# Patient Record
Sex: FEMALE | Race: WHITE | NOT HISPANIC OR LATINO | Employment: UNEMPLOYED | URBAN - METROPOLITAN AREA
[De-identification: names, ages, dates, MRNs, and addresses within clinical notes are randomized per-mention and may not be internally consistent; named-entity substitution may affect disease eponyms.]

---

## 2017-02-16 DIAGNOSIS — N95.2 VAGINAL ATROPHY: ICD-10-CM

## 2017-02-16 NOTE — TELEPHONE ENCOUNTER
Pt Pharmacy sent a Fax requesting a refill of Premarin vaginal cream. Pt last seen on 10/27/16 by Devon Maloney.

## 2017-03-21 ENCOUNTER — PATIENT MESSAGE (OUTPATIENT)
Dept: UROGYNECOLOGY | Facility: CLINIC | Age: 42
End: 2017-03-21

## 2017-03-23 ENCOUNTER — OFFICE VISIT (OUTPATIENT)
Dept: UROGYNECOLOGY | Facility: CLINIC | Age: 42
End: 2017-03-23
Payer: COMMERCIAL

## 2017-03-23 VITALS
HEIGHT: 65 IN | BODY MASS INDEX: 22.03 KG/M2 | SYSTOLIC BLOOD PRESSURE: 120 MMHG | DIASTOLIC BLOOD PRESSURE: 82 MMHG | WEIGHT: 132.25 LBS

## 2017-03-23 DIAGNOSIS — Z87.898 HISTORY OF URINARY URGENCY: ICD-10-CM

## 2017-03-23 DIAGNOSIS — N95.2 VAGINAL ATROPHY: Primary | ICD-10-CM

## 2017-03-23 DIAGNOSIS — M62.89 PELVIC FLOOR TENSION: ICD-10-CM

## 2017-03-23 DIAGNOSIS — N94.10 FEMALE DYSPAREUNIA: ICD-10-CM

## 2017-03-23 PROCEDURE — 1160F RVW MEDS BY RX/DR IN RCRD: CPT | Mod: S$GLB,,, | Performed by: NURSE PRACTITIONER

## 2017-03-23 PROCEDURE — 99999 PR PBB SHADOW E&M-EST. PATIENT-LVL III: CPT | Mod: PBBFAC,,, | Performed by: NURSE PRACTITIONER

## 2017-03-23 PROCEDURE — 99213 OFFICE O/P EST LOW 20 MIN: CPT | Mod: S$GLB,,, | Performed by: NURSE PRACTITIONER

## 2017-03-23 RX ORDER — CEFDINIR 300 MG/1
CAPSULE ORAL
COMMUNITY
Start: 2017-02-17 | End: 2024-01-10

## 2017-03-23 RX ORDER — METHYLPREDNISOLONE 4 MG/1
TABLET ORAL
COMMUNITY
Start: 2017-02-17 | End: 2017-03-23 | Stop reason: ALTCHOICE

## 2017-03-23 NOTE — PROGRESS NOTES
Urogyn follow up  03/23/2017  .  [facility]  [address] [address]    Ashley Barrett  [id number]  1975      Ashley Barrett is a 41 y.o.  here for a urogyn follow up.    09/16/2016  Title of Operation:  1)  Total laparoscopic hysterectomy  2) Laparoscopic bilateral salpingectomy  3) Laparoscopic right oophorectomy  4) Laparoscopic sacral colpopexy  5) Repair of bladder serosa (small, right, bladder base)  6) Placement of retropubic tension-free midurethral sling, Advantage Fit   7) Cystourethroscopy     Indications for Surgery:  1) UI: (+) MCKINLEY (mostly with exercise) > (--) UUI (--) pads: Daytime frequency: Q 2 hours. Nocturia: Yes: 1/night. (--) dysuria, (--) hematuria, (--) frequent UTIs. (+) complete bladder emptying.    09/13/2016  Suds   1. VOIDING PHASE:     a. Uroflowmetry:  · Prolapse reduction: No  · Voided volume: 268 mL   · Voiding time: 46 seconds  · Max flow: 82 mL/s  · Avg flow: 7 mL/s   · PVR: 0 mL  The overall configuration of this uroflow study was  normal.     At this point, the UDS machine was not working, and the remainder of the procedure was aborted. Instead, office cystometry was performed.     Office cystometry:  The patient was placed in dorsal lithotomy position with feet in stirrups. The urethra was prepped x 2 with betadine. A red rubber catheter was used to drain the bladder. The bladder was filled with 560 mL sterile water to gravity with a 60 mL thomason tipped syringe, at which point she voiced an urgent desire to void, strong desire was noted at 360 mL. The catheter was removed. She was asked to cough with (+) leak. The bladder was then drained with a catheter. She tolerated the procedure well.    Impression: (+) MCKINLEY    These findings are consistent with Positive female stress incontinence.    Assessment:  UF normal. MCKINLEY (+).   2) POP: Absent. Symptoms:(--) . (--) vaginal bleeding. (--) vaginal discharge. (+) sexually active. (+)  dyspareunia with deep penetration. . (--) Vaginal dryness. (--) vaginal estrogen use.   --POP-Q per Knoepp: Aa +2; Ba +2; C -4; Ap 0; Bp 0; D -6. Genital hiatus 3, perineal body ,2 total vaginal length 11-12.  3) BM: (--) constipation/straining. (--) chronic diarrhea. (--) hematochezia. (--) fecal incontinence. (--) fecal smearing/urgency. (--) incomplete evacuation.   4) Ovarian cyst on US:   Pelvic ultrasound  08/2016  US 8/16: Right ovary is measures 3.7 x 2.0 x 1.9 cm. There is a mildly complex 1.3 cm right ovarian cyst containing internal septation.   --reviewed with ONC with low malignant potential  --will send for intraop path       Preoperative Diagnosis:  1)  Uterovaginal prolapse  2) Cystocele  3) Mixed urinary incontinence  4) Female stress incontinence  5) Ovarian cyst, right     Postoperative Diagnosis:  1)  Uterovaginal prolapse  2) Cystocele  3) Mixed urinary incontinence  4) Female stress incontinence  5) Ovarian cyst, right (benign on frozen pathology)  6) Ovarian cyst, left (simple, probable corpus luteal)  7) Serosal bladder defect (small    09/29/2016  Cystourethrogram.   Voiding trial.   Cystourethroscopy.    Findings: Urethroscopy:  Normal.  Cystoscopy:  Normal bladder mucosa with some moderate thomason-related cystitis, mainly along the bladder base and trigone. No obvious mesh or defect noted, kobe at the mid base, cephalad to the trigone, where the serosa was oversewn intraoperatively. Bilateral ureteral flow was noted.     Assessment: Essentially normal cystourethroscopy with evidence of thomason-related cystitis.  Past Medical History:   Diagnosis Date    PONV (postoperative nausea and vomiting)     nausea only       Past Surgical History:   Procedure Laterality Date    BREAST SURGERY      augumentation    HYSTERECTOMY      SKIN TAG REMOVAL      vaginal area     History since last visit: Denies pain, bleeding, or discharge.  Bladder issues:Denies UI, dysuria, or frequency. Urgency is  "improved.  Bowel issues: Denies pain, bleeding, or discharge  Complains of dyspareunia with deep penetration-- pain stops after intercourse.    Medications:    Current Outpatient Prescriptions:     fluoxetine (PROZAC) 20 MG capsule, once daily. , Disp: , Rfl:     multivitamin capsule, Take 1 capsule by mouth once daily., Disp: , Rfl:     cefdinir (OMNICEF) 300 MG capsule, , Disp: , Rfl:     conjugated estrogens (PREMARIN) vaginal cream, Place 0.5 g vaginally twice a week., Disp: 30 g, Rfl: 3    flavoxate (URISPAS) 100 mg Tab, Take 1 tablet (100 mg total) by mouth 3 (three) times daily as needed., Disp: 42 tablet, Rfl: 0  **    ROS:  As per HPI.      Exam  /82  Ht 5' 5" (1.651 m)  Wt 60 kg (132 lb 4.4 oz)  LMP 09/03/2016 (Exact Date)  BMI 22.01 kg/m2  General: alert and oriented, no acute distress  Respiratory: normal respiratory effort  Abd: soft, non-tender, non-distended    Pelvic  Ext. Genitalia: normal external genitalia. Normal bartholin's and skeens glands  Vagina: + atrophy. Normal vaginal mucosa without lesions. No discharge noted.  No TTP along sling path.  No mesh palpable/ visible.  Non-tender bladder base without palpable mass. +TTP in bilateral levator muscles and OI  L>R.  Cervix: absent  Uterus:  absent   Urethra: no masses or tenderness  Urethral meatus: no lesions, caruncle or prolapse.    POP-Q: No prolapse    Impression  1. Vaginal atrophy     2. Pelvic floor tension  Ambulatory consult to Physical Therapy   3. Female dyspareunia  Ambulatory consult to Physical Therapy   4. History of urinary urgency       We reviewed the above issues and discussed options for short-term versus long-term management of her problems.   Plan:   1. Post op well healed.  You can resume normal activity.  You may resume intercourse.  Go slow, use lubrication.  Get assistance lifting anything over 50 pounds.  2. Continue vaginal estrogen cream 0.5 g twice weekly  Empty bladder every 3 hours.  Empty well: " wait a minute, lean forward on toilet.    Start pelvic floor PT with Rima Biswas-- [facility].  [phone]--- call to schedule  Send me a [facility] message in 2 months and let me know how you are doing.    3. Continue to control bowel movements  4. She will follow up with us in 6 months      30 minutes were spent in face to face time with this patient  75 % of this time was spent in counseling and/or coordination of care    CONI Vyas-BC [facility]  Division of Female Pelvic Medicine and Reconstructive Surgery  Department of Obstetrics & Gynecology

## 2017-03-23 NOTE — MR AVS SNAPSHOT
[facility]  [address] [address]  Phone: [phone]                  Ashley Barrett   3/23/2017 9:30 AM   Office Visit    Description:  Female : 1975   Provider:  Silvia Maloney NP   Department:  [facility]           Reason for Visit     Post-op Evaluation           Diagnoses this Visit        Comments    Vaginal atrophy    -  Primary     Pelvic floor tension         Female dyspareunia         History of urinary urgency                To Do List           Goals (5 Years of Data)     None      Follow-Up and Disposition     Return in about 6 months (around 2017).      [facility]     [facility] -  Assistance  Registered nurses in the [facility] provide clinical advisement, health education, appointment booking, and other advisory services.  Call for this free service at [phone].             Medications           Message regarding Medications     Verify the changes and/or additions to your medication regime listed below are the same as discussed with your clinician today.  If any of these changes or additions are incorrect, please notify your healthcare provider.        STOP taking these medications     methylPREDNISolone (MEDROL DOSEPACK) 4 mg tablet            Verify that the below list of medications is an accurate representation of the medications you are currently taking.  If none reported, the list may be blank. If incorrect, please contact your healthcare provider. Carry this list with you in case of emergency.           Current Medications     fluoxetine (PROZAC) 20 MG capsule once daily.     multivitamin capsule Take 1 capsule by mouth once daily.    cefdinir (OMNICEF) 300 MG capsule     conjugated estrogens (PREMARIN) vaginal cream Place 0.5 g vaginally twice a week.    flavoxate (URISPAS) 100 mg Tab Take 1 tablet (100 mg total) by mouth 3 (three) times daily as needed.  "          Clinical Reference Information           Your Vitals Were     BP Height Weight Last Period BMI    120/82 5' 5" (1.651 m) 60 kg (132 lb 4.4 oz) 09/03/2016 (Exact Date) 22.01 kg/m2      Blood Pressure          Most Recent Value    BP  120/82      Allergies as of 3/23/2017     No Known Allergies      Immunizations Administered on Date of Encounter - 3/23/2017     None      Orders Placed During Today's Visit      Normal Orders This Visit    Ambulatory consult to Physical Therapy       Instructions    1. Post op well healed.  You can resume normal activity.  You may resume intercourse.  Go slow, use lubrication.  Get assistance lifting anything over 50 pounds.  2. Continue vaginal estrogen cream 0.5 g twice weekly  Empty bladder every 3 hours.  Empty well: wait a minute, lean forward on toilet.    Start pelvic floor PT with Rima Biswas-- [facility].  [phone]--- call to schedule  Send me a [facility] message in 2 months and let me know how you are doing.    3. Continue to control bowel movements  4. She will follow up with us in 6 months       Language Assistance Services     ATTENTION: Language assistance services are available, free of charge. Please call [phone].      ATENCIÓN: Si habla nichol, tiene a edmondson disposición servicios gratuitos de asistencia lingüística. Llame al [phone].     BOOKER Ý: N?u b?n nói Ti?ng Vi?t, có các d?ch v? h? tr? ngôn ng? mi?n phí dành cho b?n. G?i s? [phone].         [facility] complies with applicable Federal civil rights laws and does not discriminate on the basis of race, color, national origin, age, disability, or sex.        "

## 2017-03-23 NOTE — PATIENT INSTRUCTIONS
1. Post op well healed.  You can resume normal activity.  You may resume intercourse.  Go slow, use lubrication.  Get assistance lifting anything over 50 pounds.  2. Continue vaginal estrogen cream 0.5 g twice weekly  Empty bladder every 3 hours.  Empty well: wait a minute, lean forward on toilet.    Start pelvic floor PT with Rima Biswas-- [facility].  [phone]--- call to schedule  Send me a [facility] message in 2 months and let me know how you are doing.    3. Continue to control bowel movements  4. She will follow up with us in 6 months

## 2017-09-20 ENCOUNTER — TELEPHONE (OUTPATIENT)
Dept: UROGYNECOLOGY | Facility: CLINIC | Age: 42
End: 2017-09-20

## 2017-09-22 ENCOUNTER — OFFICE VISIT (OUTPATIENT)
Dept: UROGYNECOLOGY | Facility: CLINIC | Age: 42
End: 2017-09-22
Payer: COMMERCIAL

## 2017-09-22 VITALS
DIASTOLIC BLOOD PRESSURE: 60 MMHG | SYSTOLIC BLOOD PRESSURE: 100 MMHG | HEIGHT: 65 IN | BODY MASS INDEX: 22.22 KG/M2 | WEIGHT: 133.38 LBS

## 2017-09-22 DIAGNOSIS — N95.2 VAGINAL ATROPHY: ICD-10-CM

## 2017-09-22 DIAGNOSIS — M62.89 PELVIC FLOOR TENSION: Primary | ICD-10-CM

## 2017-09-22 DIAGNOSIS — N94.10 FEMALE DYSPAREUNIA: ICD-10-CM

## 2017-09-22 PROCEDURE — 3008F BODY MASS INDEX DOCD: CPT | Mod: S$GLB,,, | Performed by: NURSE PRACTITIONER

## 2017-09-22 PROCEDURE — 99999 PR PBB SHADOW E&M-EST. PATIENT-LVL III: CPT | Mod: PBBFAC,,, | Performed by: NURSE PRACTITIONER

## 2017-09-22 PROCEDURE — 99213 OFFICE O/P EST LOW 20 MIN: CPT | Mod: S$GLB,,, | Performed by: NURSE PRACTITIONER

## 2017-09-22 RX ORDER — FLUOXETINE HYDROCHLORIDE 20 MG/1
20 CAPSULE ORAL
COMMUNITY
End: 2017-09-22

## 2017-09-22 NOTE — PROGRESS NOTES
Urogyn follow up  09/22/2017  .  [facility]  [address] [address]    Ashley Barrett  [id number]  1975      Ashley Barrett is a 42 y.o.  here for a urogyn follow up.    09/16/2016  Title of Operation:  1)  Total laparoscopic hysterectomy  2) Laparoscopic bilateral salpingectomy  3) Laparoscopic right oophorectomy  4) Laparoscopic sacral colpopexy  5) Repair of bladder serosa (small, right, bladder base)  6) Placement of retropubic tension-free midurethral sling, Advantage Fit   7) Cystourethroscopy     Indications for Surgery:  1) UI: (+) MCKINLEY (mostly with exercise) > (--) UUI (--) pads: Daytime frequency: Q 2 hours. Nocturia: Yes: 1/night. (--) dysuria, (--) hematuria, (--) frequent UTIs. (+) complete bladder emptying.    09/13/2016  Suds   1. VOIDING PHASE:     a. Uroflowmetry:  · Prolapse reduction: No  · Voided volume: 268 mL   · Voiding time: 46 seconds  · Max flow: 82 mL/s  · Avg flow: 7 mL/s   · PVR: 0 mL  The overall configuration of this uroflow study was  normal.     At this point, the UDS machine was not working, and the remainder of the procedure was aborted. Instead, office cystometry was performed.     Office cystometry:  The patient was placed in dorsal lithotomy position with feet in stirrups. The urethra was prepped x 2 with betadine. A red rubber catheter was used to drain the bladder. The bladder was filled with 560 mL sterile water to gravity with a 60 mL thomason tipped syringe, at which point she voiced an urgent desire to void, strong desire was noted at 360 mL. The catheter was removed. She was asked to cough with (+) leak. The bladder was then drained with a catheter. She tolerated the procedure well.    Impression: (+) MCKINLEY    These findings are consistent with Positive female stress incontinence.    Assessment:  UF normal. MCKINLEY (+).   2) POP: Absent. Symptoms:(--) . (--) vaginal bleeding. (--) vaginal discharge. (+) sexually active. (+)  dyspareunia with deep penetration. . (--) Vaginal dryness. (--) vaginal estrogen use.   --POP-Q per Knoepp: Aa +2; Ba +2; C -4; Ap 0; Bp 0; D -6. Genital hiatus 3, perineal body ,2 total vaginal length 11-12.  3) BM: (--) constipation/straining. (--) chronic diarrhea. (--) hematochezia. (--) fecal incontinence. (--) fecal smearing/urgency. (--) incomplete evacuation.   4) Ovarian cyst on US:   Pelvic ultrasound  08/2016  US 8/16: Right ovary is measures 3.7 x 2.0 x 1.9 cm. There is a mildly complex 1.3 cm right ovarian cyst containing internal septation.   --reviewed with ONC with low malignant potential  --will send for intraop path       Preoperative Diagnosis:  1)  Uterovaginal prolapse  2) Cystocele  3) Mixed urinary incontinence  4) Female stress incontinence  5) Ovarian cyst, right     Postoperative Diagnosis:  1)  Uterovaginal prolapse  2) Cystocele  3) Mixed urinary incontinence  4) Female stress incontinence  5) Ovarian cyst, right (benign on frozen pathology)  6) Ovarian cyst, left (simple, probable corpus luteal)  7) Serosal bladder defect (small    09/29/2016  Cystourethrogram.   Voiding trial.   Cystourethroscopy.    Findings: Urethroscopy:  Normal.  Cystoscopy:  Normal bladder mucosa with some moderate thomason-related cystitis, mainly along the bladder base and trigone. No obvious mesh or defect noted, kobe at the mid base, cephalad to the trigone, where the serosa was oversewn intraoperatively. Bilateral ureteral flow was noted.     Assessment: Essentially normal cystourethroscopy with evidence of thomason-related cystitis.  Past Medical History:   Diagnosis Date    PONV (postoperative nausea and vomiting)     nausea only       Past Surgical History:   Procedure Laterality Date    BREAST SURGERY      augumentation    HYSTERECTOMY      SKIN TAG REMOVAL      vaginal area     History since last visit:Denies bleeding or discharge.  Having aching pressure in L side of her vagina and in rectum, non-  "radiating.  She was lifting objects heavier than 50 pounds recently.  Denies bulge in the vagina.  Bladder issues:Denies UI, dysuria, or frequency. Urgency is improved.  Bowel issues: Denies contipation, straining, fecal smearing.  Not using fiber supplement.  Complains of dyspareunia with deep penetration, not as bad as last visit---did not do pelvic floor PT with Rima Biswas in [place]    Medications:    Current Outpatient Prescriptions:     fluoxetine (PROZAC) 20 MG capsule, once daily. , Disp: , Rfl:     multivitamin capsule, Take 1 capsule by mouth once daily., Disp: , Rfl:     cefdinir (OMNICEF) 300 MG capsule, , Disp: , Rfl:     conjugated estrogens (PREMARIN) vaginal cream, Place 0.5 g vaginally twice a week., Disp: 30 g, Rfl: 3    flavoxate (URISPAS) 100 mg Tab, Take 1 tablet (100 mg total) by mouth 3 (three) times daily as needed., Disp: 42 tablet, Rfl: 0  **    ROS:  As per HPI.      Exam  /60 (BP Location: Right arm, Patient Position: Sitting)   Ht 5' 5" (1.651 m)   Wt 60.5 kg (133 lb 6.1 oz)   LMP 09/03/2016 (Exact Date)   BMI 22.20 kg/m²   General: alert and oriented, no acute distress  Respiratory: normal respiratory effort  Abd: soft, non-tender, non-distended    Pelvic  Ext. Genitalia: normal external genitalia. Normal bartholin's and skeens glands  Vagina: + atrophy. Normal vaginal mucosa without lesions. No discharge noted.  No TTP along sling path.  No mesh palpable/ visible.  Non-tender bladder base without palpable mass. +TTP in bilateral levator muscles and OI  L>R.  Cervix: absent  Uterus:  absent   Urethra: no masses or tenderness  Urethral meatus: no lesions, caruncle or prolapse.    POP-Q: No prolapse noted    Impression  No diagnosis found.  We reviewed the above issues and discussed options for short-term versus long-term management of her problems.   Plan:   1. Get assistance lifting anything over 50 pounds.  2. Continue vaginal estrogen cream 0.5 g twice " weekly  Empty bladder every 3 hours.  Empty well: wait a minute, lean forward on toilet.    Start pelvic floor PT with Rima Biswas-- [facility].  [phone]--- call to schedule    3. Continue to control bowel movements  4. She will follow up with us in 4 months      30 minutes were spent in face to face time with this patient  75 % of this time was spent in counseling and/or coordination of care    Silvia Maloney, CONI-BC [facility]  Division of Female Pelvic Medicine and Reconstructive Surgery  Department of Obstetrics & Gynecology

## 2017-09-22 NOTE — PATIENT INSTRUCTIONS
1. Get assistance lifting anything over 50 pounds.  2. Continue vaginal estrogen cream 0.5 g twice weekly  Empty bladder every 3 hours.  Empty well: wait a minute, lean forward on toilet.    Start pelvic floor PT with Rima Biswas-- [facility].  [phone]--- call to schedule    3. Continue to control bowel movements  4. She will follow up with us in 4 months

## 2017-09-23 PROBLEM — M62.89 PELVIC FLOOR TENSION: Status: ACTIVE | Noted: 2017-09-23

## 2017-09-23 PROBLEM — N95.2 VAGINAL ATROPHY: Status: ACTIVE | Noted: 2017-09-23

## 2017-10-03 ENCOUNTER — PATIENT MESSAGE (OUTPATIENT)
Dept: UROGYNECOLOGY | Facility: CLINIC | Age: 42
End: 2017-10-03

## 2017-10-16 ENCOUNTER — CLINICAL SUPPORT (OUTPATIENT)
Dept: REHABILITATION | Facility: HOSPITAL | Age: 42
End: 2017-10-16
Payer: COMMERCIAL

## 2017-10-16 DIAGNOSIS — N95.2 VAGINAL ATROPHY: ICD-10-CM

## 2017-10-16 DIAGNOSIS — M62.89 PELVIC FLOOR TENSION: Primary | ICD-10-CM

## 2017-10-16 PROCEDURE — 97140 MANUAL THERAPY 1/> REGIONS: CPT | Performed by: PHYSICAL THERAPIST

## 2017-10-16 PROCEDURE — 97535 SELF CARE MNGMENT TRAINING: CPT | Performed by: PHYSICAL THERAPIST

## 2017-10-16 PROCEDURE — 97161 PT EVAL LOW COMPLEX 20 MIN: CPT | Performed by: PHYSICAL THERAPIST

## 2017-10-16 NOTE — PROGRESS NOTES
OUTPATIENT PHYSICAL THERAPY EVALUATION        Patient: Ashley Su [facility] #:  [id number]    Date of treatment: 10/16/2017       HISTORY      Ashley is a 42 y.o. female evaluated on 10/16/2017    Physician:  Silvia Maloney NP   Diagnosis:   Encounter Diagnoses   Name Primary?    Pelvic floor tension Yes    Vaginal atrophy       Treatment ordered: Physical Therapy  Medical History:   Past Medical History:   Diagnosis Date    PONV (postoperative nausea and vomiting)     nausea only      Surgical History:   Past Surgical History:   Procedure Laterality Date    BREAST SURGERY      augumentation    HYSTERECTOMY      SKIN TAG REMOVAL      vaginal area      Medications:   Current Outpatient Prescriptions   Medication Sig    cefdinir (OMNICEF) 300 MG capsule     conjugated estrogens (PREMARIN) vaginal cream Place 0.5 g vaginally twice a week.    flavoxate (URISPAS) 100 mg Tab Take 1 tablet (100 mg total) by mouth 3 (three) times daily as needed.    fluoxetine (PROZAC) 20 MG capsule once daily.     multivitamin capsule Take 1 capsule by mouth once daily.     No current facility-administered medications for this visit.        Allergies: Review of patient's allergies indicates:  No Known Allergies       OB/GYN History:  childbirth vaginal delivery, prolapse, painful vaginal penetration, pelvic pain and pelvic sling and partial hysterectomy    Bladder/Bowel History: none      SUBJECTIVE     Date of onset: several years ago, began having pain with intercourse and increased bleeding during intercourse  History of current complaint: 1 year  Patient's goals for therapy: non painful sexual intercourse  Pain: Patient reports 6/10, with 0 being the lowest and 10 being the highest.    Activities that cause symptoms: sexual activity    Previous treatment included no    Sexually active? yes    Frequency of urination:   Daytime: 5-6x            Nighttime: 1x nightly    Difficulty initiating urine stream: no    Urine stream: weak and splayed  Complete emptying:yes   Bladder leakage: no but double voiding      Frequency of bowel movements: 1-2 times a day  Difficulty initiating BM: no  Quality/Shape of BM: [place] Stool Chart 4-5  Colon leakage: no        Types of fluid intake: water, coffee and sports drinks  Diet: WNL  Current exercise:5x weekly- resitence training nad running    Occupation: Pt not working    OBJECTIVE     ORTHO SCREEN  Posture: WNL  Pelvic alignment: no sign of deviations noted in supine  Palpation: increased tone and pain with palpation to iliopsoas mm  ABDOMINALS  Scarring: incisions from hysterectomy on abdominal area  Diastasis: 1-2 fingers above umbilicus, 1-2fingers at umbilicus  Abdominal strength: Rectus: 4/5     TA: 4/5  Chaperone: refused  Consent signed: yes    VAGINAL PELVIC FLOOR EXAM    EXTERNAL ASSESSMENT  Introitus: WNL  Skin condition: WNL     Pain:  none  Voluntary contraction: visible lift  Voluntary relaxation: visible drop  Involuntary contraction: visible lift  Bearing down: bulge  Perineal descent: absent      INTERNAL ASSESSMENT  Pain: none   Sensation: WNL  Vaginal vault: WNL  Muscle Bulk: WNL  Muscle Power: 3/5  Muscle Endurance: 3 sec  # Reps To Fatigue: 3         SEMG EVALUATION: Deferred due to time constraints          Other: increased abdominal breathing - educated on diaphragmatic breathing    TREATMENT      Therapeutic Exercise to develop  Improved diaphragmatic breathing for 5 minutes including: diaphragmatic breathing and iliopsoas streches      Manual Therapy to develop extensibility for 8 minutes including: trigger point/myofascial release of iliopsoas m    Evaluation    Education: instructed on general anatomy/physiology of urinary/bowel system; discussed plan of care with patient; instructed in benefits/risks of treatment; instructed in alternative methods of treatment; instructed in risks of refusing treatment; patient a parent agreed to treatment plan.     Also  educated in: anatomy/physiology of pelvic floor, intercourse positions, diaphragmatic breathing, kegels and HEP    ASSESSMENT      This is a 42 y.o. female referred to outpatient physical therapy and presents with a medical diagnosis of pelvic pain. Patient will benefit from skilled physical therapy to improve pain during sexual intercourse.    SEMG Problems: did not evaluate  No skin irritation, erythema, or other adverse effects observed from electrode placement.    Abuse/Neglect: no signs  Nutritional Status: WDWN       Pt's spiritual, cultural and educational needs considered and pt agreeable to plan of care and goals as stated below:     Medical necessity is demonstrated by the following IMPAIRMENTS/PROMBLEMS     History  Co-morbidities and personal factors that may impact the plan of care Examination  Body Structures and Functions, activity limitations and participation restrictions that may impact the plan of care Clinical Presentation   Decision Making/ Complexity Score   Co-morbidities:                 Personal Factors:    Body Regions/Systems/Functions:    decreased pelvic muscle strength, decreased endurance of the pelvic muscles, increased tension of the pelvic muscles and increased nocturia           Activity limitations:   Barriers to Learning: none  Environmental Barriers: none noted    Participation Restrictions: sexual intercourse          low           PLAN    Frequency:1-2 x week  Duration: 4-6 weeks  Short Term Goals: 1-2 weeks   Pt will report improved ability to sleep uninterrupted by excessive nocturia 5/7 days per week.   Pt will urinate without crede/Valsalva to prevent adverse effects to adjacent structures.  Pt/family will be independent with HEP for continued self-management of symptoms.    Long Term Goals: 4-6 weeks   Pt deny pain with sexual intercourse    Physical therapy will include: therapeutic exercises, neuromuscular re-education, manual therapy, modalities PRN and patient/family  education      Therapist: Rima Biswas, PT  10/16/2017

## 2017-10-23 ENCOUNTER — CLINICAL SUPPORT (OUTPATIENT)
Dept: REHABILITATION | Facility: HOSPITAL | Age: 42
End: 2017-10-23
Payer: COMMERCIAL

## 2017-10-23 DIAGNOSIS — M62.89 PELVIC FLOOR TENSION: Primary | ICD-10-CM

## 2017-10-23 PROCEDURE — 97110 THERAPEUTIC EXERCISES: CPT | Performed by: PHYSICAL THERAPIST

## 2017-10-23 PROCEDURE — 97140 MANUAL THERAPY 1/> REGIONS: CPT | Performed by: PHYSICAL THERAPIST

## 2017-10-23 NOTE — PROGRESS NOTES
OUTPATIENT PHYSICAL THERAPY DAILY NOTE       Patient: Ashley Su [facility] #:  [id number]    Diagnosis:   Encounter Diagnosis   Name Primary?    Pelvic floor tension Yes      Date of treatment: 10/23/2017     Visit # 2    SUBJECTIVE   Pt reports: decreased hip pain after run and during intercourse since last visit      OBJECTIVE     Therapeutic Exercise to develop  Increased endurance and strength in pelvic floor mm during running and workout to reduce risk of pelvic sling failure for 15 minutes including:   Kegels with assist of SEMG    Manual Therapy to develop flexibility and extensibility in R iliopsoas for 15 minutes including:MFR and MET      Modalities NT    Education: 15 min: progression of hip strengthening and running. Discussed progression of plan of care with patient; educated pt in activity modification; reviewed HEP with pt. Pt demonstrated and verbalized understanding of all instruction and was provided with a handout of HEP      ASSESSMENT    Pt had decreased tone and tenderness to iliopsoas mm today. Resistance with   Selective tension test to R  Iliopsoas increased abdominal pain. R hip flexor mm strength 4-/5 and L 5/5  Pt tolerated entire treatment well with no visible adverse effects. Pt  Will the patient continue to benefit from skilled PT intervention        PLAN     Continue with established Plan of Care, working toward established PT goals.

## 2017-10-23 NOTE — PROGRESS NOTES
OUTPATIENT PHYSICAL THERAPY EVALUATION        Patient: Ashley Su [facility] #:  [id number]    Date of treatment: 10/23/2017       HISTORY      Ashley is a 42 y.o. female evaluated on 10/23/2017    Physician:  Silvia Maloney NP   Diagnosis:   Encounter Diagnosis   Name Primary?    Pelvic floor tension Yes      Treatment ordered: Physical Therapy  Medical History:   Past Medical History:   Diagnosis Date    PONV (postoperative nausea and vomiting)     nausea only      Surgical History:   Past Surgical History:   Procedure Laterality Date    BREAST SURGERY      augumentation    HYSTERECTOMY      SKIN TAG REMOVAL      vaginal area      Medications:   Current Outpatient Prescriptions   Medication Sig    cefdinir (OMNICEF) 300 MG capsule     conjugated estrogens (PREMARIN) vaginal cream Place 0.5 g vaginally twice a week.    flavoxate (URISPAS) 100 mg Tab Take 1 tablet (100 mg total) by mouth 3 (three) times daily as needed.    fluoxetine (PROZAC) 20 MG capsule once daily.     multivitamin capsule Take 1 capsule by mouth once daily.     No current facility-administered medications for this visit.        Allergies: Review of patient's allergies indicates:  No Known Allergies       OB/GYN History:  childbirth vaginal delivery, prolapse, painful vaginal penetration, pelvic pain and pelvic sling and partial hysterectomy    Bladder/Bowel History: none      SUBJECTIVE     Date of onset: several years ago, began having pain with intercourse and increased bleeding during intercourse  History of current complaint: 1 year  Patient's goals for therapy: non painful sexual intercourse  Pain: Patient reports 6/10, with 0 being the lowest and 10 being the highest.    Activities that cause symptoms: sexual activity    Previous treatment included no    Sexually active? yes    Frequency of urination:   Daytime: 5-6x            Nighttime: 1x nightly    Difficulty initiating urine stream: no   Urine stream: weak  and splayed  Complete emptying:yes   Bladder leakage: no but double voiding      Frequency of bowel movements: 1-2 times a day  Difficulty initiating BM: no  Quality/Shape of BM: Sharkey Stool Chart 4-5  Colon leakage: no        Types of fluid intake: water, coffee and sports drinks  Diet: WNL  Current exercise:5x weekly- resitence training nad running    Occupation: Pt not working    OBJECTIVE     ORTHO SCREEN  Posture: WNL  Pelvic alignment: no sign of deviations noted in supine  Palpation: increased tone and pain with palpation to iliopsoas mm  ABDOMINALS  Scarring: incisions from hysterectomy on abdominal area  Diastasis: 1-2 fingers above umbilicus, 1-2fingers at umbilicus  Abdominal strength: Rectus: 4/5     TA: 4/5  Chaperone: refused  Consent signed: yes    VAGINAL PELVIC FLOOR EXAM    EXTERNAL ASSESSMENT  Introitus: WNL  Skin condition: WNL     Pain:  none  Voluntary contraction: visible lift  Voluntary relaxation: visible drop  Involuntary contraction: visible lift  Bearing down: bulge  Perineal descent: absent      INTERNAL ASSESSMENT  Pain: none   Sensation: WNL  Vaginal vault: WNL  Muscle Bulk: WNL  Muscle Power: 3/5  Muscle Endurance: 3 sec  # Reps To Fatigue: 3         SEMG EVALUATION: Deferred due to time constraints          Other: increased abdominal breathing - educated on diaphragmatic breathing    TREATMENT      Therapeutic Exercise to develop  Improved diaphragmatic breathing for 5 minutes including: diaphragmatic breathing and iliopsoas streches      Manual Therapy to develop extensibility for 8 minutes including: trigger point/myofascial release of iliopsoas m    Evaluation    Education: instructed on general anatomy/physiology of urinary/bowel system; discussed plan of care with patient; instructed in benefits/risks of treatment; instructed in alternative methods of treatment; instructed in risks of refusing treatment; patient a parent agreed to treatment plan.     Also educated in:  anatomy/physiology of pelvic floor, intercourse positions, diaphragmatic breathing, kegels and HEP    ASSESSMENT      This is a 42 y.o. female referred to outpatient physical therapy and presents with a medical diagnosis of pelvic pain. Patient will benefit from skilled physical therapy to improve pain during sexual intercourse.    SEMG Problems: did not evaluate  No skin irritation, erythema, or other adverse effects observed from electrode placement.    Abuse/Neglect: no signs  Nutritional Status: WDWN       Pt's spiritual, cultural and educational needs considered and pt agreeable to plan of care and goals as stated below:     Medical necessity is demonstrated by the following IMPAIRMENTS/PROMBLEMS     History  Co-morbidities and personal factors that may impact the plan of care Examination  Body Structures and Functions, activity limitations and participation restrictions that may impact the plan of care Clinical Presentation   Decision Making/ Complexity Score   Co-morbidities:                 Personal Factors:    Body Regions/Systems/Functions:    decreased pelvic muscle strength, decreased endurance of the pelvic muscles, increased tension of the pelvic muscles and increased nocturia           Activity limitations:   Barriers to Learning: none  Environmental Barriers: none noted    Participation Restrictions: sexual intercourse          low           PLAN    Frequency:1-2 x week  Duration: 4-6 weeks  Short Term Goals: 1-2 weeks   Pt will report improved ability to sleep uninterrupted by excessive nocturia 5/7 days per week.   Pt will urinate without crede/Valsalva to prevent adverse effects to adjacent structures.  Pt/family will be independent with HEP for continued self-management of symptoms.    Long Term Goals: 4-6 weeks   Pt deny pain with sexual intercourse    Physical therapy will include: therapeutic exercises, neuromuscular re-education, manual therapy, modalities PRN and patient/family  education      Therapist: Rima Biswas, PT  10/23/2017

## 2023-03-06 NOTE — TELEPHONE ENCOUNTER
----- Message from Gladis Ramirez sent at 9/20/2017 12:28 PM CDT -----  Contact: Pt  Pt would like to be called back regarding past symptoms from a previous surgery.    Pt are starting to have those symptoms.    Please call pt at [phone].        Thanks!  
Pt stated she have some concerns regarding her lifting something heavy and would like to be seen to discuss post surgery symptoms. Past was last seen on 3/23/17. Pt is scheduled w/ Silvia on tomorrow at 3:30 pm.  
ambulatory